# Patient Record
Sex: MALE | Race: WHITE
[De-identification: names, ages, dates, MRNs, and addresses within clinical notes are randomized per-mention and may not be internally consistent; named-entity substitution may affect disease eponyms.]

---

## 2019-03-04 ENCOUNTER — HOSPITAL ENCOUNTER (OUTPATIENT)
Dept: HOSPITAL 84 - D.OPS | Age: 69
LOS: 1 days | Discharge: HOME | End: 2019-03-05
Attending: FAMILY MEDICINE
Payer: MEDICARE

## 2019-03-04 ENCOUNTER — HOSPITAL ENCOUNTER (INPATIENT)
Dept: HOSPITAL 84 - D.ER | Age: 69
LOS: 1 days | Discharge: ANOTHER HOSPITAL | DRG: 690 | End: 2019-03-05
Attending: FAMILY MEDICINE | Admitting: FAMILY MEDICINE
Payer: MEDICARE

## 2019-03-04 VITALS — DIASTOLIC BLOOD PRESSURE: 80 MMHG | SYSTOLIC BLOOD PRESSURE: 156 MMHG

## 2019-03-04 VITALS — SYSTOLIC BLOOD PRESSURE: 142 MMHG | DIASTOLIC BLOOD PRESSURE: 73 MMHG

## 2019-03-04 VITALS — WEIGHT: 185.49 LBS | BODY MASS INDEX: 34.14 KG/M2 | BODY MASS INDEX: 34.14 KG/M2 | HEIGHT: 62 IN

## 2019-03-04 VITALS — DIASTOLIC BLOOD PRESSURE: 75 MMHG | SYSTOLIC BLOOD PRESSURE: 137 MMHG

## 2019-03-04 VITALS — SYSTOLIC BLOOD PRESSURE: 143 MMHG | DIASTOLIC BLOOD PRESSURE: 75 MMHG

## 2019-03-04 VITALS — BODY MASS INDEX: 33.7 KG/M2

## 2019-03-04 DIAGNOSIS — G40.909: ICD-10-CM

## 2019-03-04 DIAGNOSIS — E11.9: ICD-10-CM

## 2019-03-04 DIAGNOSIS — R45.851: ICD-10-CM

## 2019-03-04 DIAGNOSIS — K21.9: ICD-10-CM

## 2019-03-04 DIAGNOSIS — E78.5: ICD-10-CM

## 2019-03-04 DIAGNOSIS — E86.0: ICD-10-CM

## 2019-03-04 DIAGNOSIS — E66.9: ICD-10-CM

## 2019-03-04 DIAGNOSIS — I10: ICD-10-CM

## 2019-03-04 DIAGNOSIS — E53.8: ICD-10-CM

## 2019-03-04 DIAGNOSIS — E73.9: ICD-10-CM

## 2019-03-04 DIAGNOSIS — F03.90: ICD-10-CM

## 2019-03-04 DIAGNOSIS — N40.0: ICD-10-CM

## 2019-03-04 DIAGNOSIS — I69.354: ICD-10-CM

## 2019-03-04 DIAGNOSIS — D64.9: ICD-10-CM

## 2019-03-04 DIAGNOSIS — N39.0: Primary | ICD-10-CM

## 2019-03-04 DIAGNOSIS — F32.9: ICD-10-CM

## 2019-03-04 DIAGNOSIS — I25.10: ICD-10-CM

## 2019-03-04 DIAGNOSIS — F03.90: Primary | ICD-10-CM

## 2019-03-04 LAB
ALBUMIN SERPL-MCNC: 2.9 G/DL (ref 3.4–5)
ALP SERPL-CCNC: 74 U/L (ref 46–116)
ALT SERPL-CCNC: 18 U/L (ref 10–68)
AMPHETAMINES UR QL SCN: NEGATIVE QUAL
ANION GAP SERPL CALC-SCNC: 17.4 MMOL/L (ref 8–16)
APAP SERPL-MCNC: 0 UG/ML (ref 10–30)
APPEARANCE UR: CLEAR
BACTERIA #/AREA URNS HPF: (no result) /HPF
BARBITURATES UR QL SCN: NEGATIVE QUAL
BASOPHILS NFR BLD AUTO: 0.7 % (ref 0–2)
BENZODIAZ UR QL SCN: NEGATIVE QUAL
BILIRUB SERPL-MCNC: 0.17 MG/DL (ref 0.2–1.3)
BILIRUB SERPL-MCNC: NEGATIVE MG/DL
BUN SERPL-MCNC: 43 MG/DL (ref 7–18)
BZE UR QL SCN: NEGATIVE QUAL
CALCIUM SERPL-MCNC: 8.1 MG/DL (ref 8.5–10.1)
CANNABINOIDS UR QL SCN: NEGATIVE QUAL
CHLORIDE SERPL-SCNC: 109 MMOL/L (ref 98–107)
CHOLEST/HDLC SERPL: 2.7 RATIO (ref 2.3–4.9)
CO2 SERPL-SCNC: 24.3 MMOL/L (ref 21–32)
COLOR UR: YELLOW
CREAT SERPL-MCNC: 1.5 MG/DL (ref 0.6–1.3)
EOSINOPHIL NFR BLD: 4 % (ref 0–7)
ERYTHROCYTE [DISTWIDTH] IN BLOOD BY AUTOMATED COUNT: 14.6 % (ref 11.5–14.5)
EST. AVERAGE GLUCOSE BLD GHB EST-MCNC: 177 MG/DL (ref 74–154)
ETHANOL SERPL-MCNC: 2 MG/DL (ref 0–10)
GLOBULIN SER-MCNC: 4.3 G/L
GLUCOSE SERPL-MCNC: 125 MG/DL (ref 74–106)
GLUCOSE SERPL-MCNC: NEGATIVE MG/DL
HCT VFR BLD CALC: 35.7 % (ref 42–54)
HDLC SERPL-MCNC: 27 MG/DL (ref 32–96)
HGB BLD-MCNC: 11.5 G/DL (ref 13.5–17.5)
IMM GRANULOCYTES NFR BLD: 0.4 % (ref 0–5)
KETONES UR STRIP-MCNC: NEGATIVE MG/DL
LDL-HDL RATIO: 0.4 RATIO (ref 1.5–3.5)
LDLC SERPL-MCNC: 11 MG/DL (ref 0–100)
LYMPHOCYTES NFR BLD AUTO: 38.3 % (ref 15–50)
MAGNESIUM SERPL-MCNC: 1.8 MG/DL (ref 1.8–2.4)
MCH RBC QN AUTO: 29.3 PG (ref 26–34)
MCHC RBC AUTO-ENTMCNC: 32.2 G/DL (ref 31–37)
MCV RBC: 90.8 FL (ref 80–100)
MONOCYTES NFR BLD: 9.4 % (ref 2–11)
NEUTROPHILS NFR BLD AUTO: 47.2 % (ref 40–80)
NITRITE UR-MCNC: POSITIVE MG/ML
OPIATES UR QL SCN: NEGATIVE QUAL
OSMOLALITY SERPL CALC.SUM OF ELEC: 302 MOSM/KG (ref 275–300)
PCP UR QL SCN: NEGATIVE QUAL
PH UR STRIP: 5 [PH] (ref 5–6)
PLATELET # BLD: 244 10X3/UL (ref 130–400)
PMV BLD AUTO: 8.8 FL (ref 7.4–10.4)
POTASSIUM SERPL-SCNC: 4.7 MMOL/L (ref 3.5–5.1)
PROT SERPL-MCNC: 7.2 G/DL (ref 6.4–8.2)
PROT UR-MCNC: (no result) MG/DL
RBC # BLD AUTO: 3.93 10X6/UL (ref 4.2–6.1)
RBC #/AREA URNS HPF: (no result) /HPF (ref 0–5)
SODIUM SERPL-SCNC: 146 MMOL/L (ref 136–145)
SP GR UR STRIP: 1.02 (ref 1–1.02)
T4 SERPL-MCNC: 5.9 UG/DL (ref 4.7–13.3)
TRIGL SERPL-MCNC: 183 MG/DL (ref 30–200)
TSH SERPL-ACNC: 0.4 UIU/ML (ref 0.36–3.74)
UROBILINOGEN UR-MCNC: NORMAL MG/DL
WBC # BLD AUTO: 5.5 10X3/UL (ref 4.8–10.8)
WBC #/AREA URNS HPF: (no result) /HPF (ref 0–5)

## 2019-03-04 NOTE — NUR
PT RECEIVED TO UNIT VIA STRETCHER FROM ED WITH ER NURSE. PT TRANSFERRED TO ICU
BED WITHOUT DIFFUCULTY. PT CLOTHES REMOVED AND PLACED IN BAG WITH SHOES AND
PLACED IN GOWN. ANSWERS ORIENTATION QUESTIONS APPROPRIATLY WITH CONFUSION
NOTED IN FURTHER CONVERSATION, SUCH AS SAYING THAT THIS IS THE STRANGEST
Gnosticism AFTER SAYING HE WAS AT [facility]. PT COMPLAINED OF
BEING COLD WITH BLANKETS APPLIED AND THERMOSTAT RAISED FOR COMFORT. NO
CONCERNS NOTED AT THIS TIME. WILL CONTINUE TO OBSERVE.

## 2019-03-04 NOTE — NUR
PT RESTING ON ER STRETCHER, RESPIRATIONS EVEN AND UNLABORED, NO SIGNS OF
DISTRESS NOTED, WILL CONTINUE TO MONITOR.

## 2019-03-04 NOTE — NUR
PT ASSISTED TO STAND FOR URINE SAMPLE, WAS ASSISTED BACK TO BED, DENIES NEEDS,
WILL CONTINUE TO MONITOR.

## 2019-03-04 NOTE — NUR
PT RESTING IN BED, URINE SAMPLE REQUESTE, PT STATES HE CAN NOT ALWAYS GO WHEN
HE WANTS TO, ENCOURAGED HIM TO TRY, WILL CONTINUE TO MONITOR.

## 2019-03-04 NOTE — NUR
PT SPO2% DROPS AT TIMES AND RETURNS TO 90'S. PT SNORES AT TIMES OF DROP. PT
PLACED ON N/C 2LPM. WILL CONTINUE TO OBSERVE.

## 2019-03-05 ENCOUNTER — HOSPITAL ENCOUNTER (INPATIENT)
Dept: HOSPITAL 84 - D.PSYCH | Age: 69
LOS: 15 days | Discharge: HOME | DRG: 57 | End: 2019-03-20
Attending: PSYCHIATRY & NEUROLOGY | Admitting: PSYCHIATRY & NEUROLOGY
Payer: MEDICARE

## 2019-03-05 ENCOUNTER — HOSPITAL ENCOUNTER (OUTPATIENT)
Dept: HOSPITAL 84 - D.OPS | Age: 69
Discharge: OTHER INSTITUTION | End: 2019-03-05
Attending: FAMILY MEDICINE
Payer: MEDICARE

## 2019-03-05 VITALS — SYSTOLIC BLOOD PRESSURE: 133 MMHG | DIASTOLIC BLOOD PRESSURE: 74 MMHG

## 2019-03-05 VITALS
BODY MASS INDEX: 29.63 KG/M2 | WEIGHT: 184.39 LBS | BODY MASS INDEX: 29.63 KG/M2 | HEIGHT: 66 IN | BODY MASS INDEX: 29.63 KG/M2 | BODY MASS INDEX: 29.63 KG/M2 | WEIGHT: 184.39 LBS | HEIGHT: 66 IN

## 2019-03-05 VITALS — SYSTOLIC BLOOD PRESSURE: 126 MMHG | DIASTOLIC BLOOD PRESSURE: 77 MMHG

## 2019-03-05 VITALS — DIASTOLIC BLOOD PRESSURE: 81 MMHG | SYSTOLIC BLOOD PRESSURE: 132 MMHG

## 2019-03-05 VITALS — DIASTOLIC BLOOD PRESSURE: 68 MMHG | SYSTOLIC BLOOD PRESSURE: 113 MMHG

## 2019-03-05 VITALS — DIASTOLIC BLOOD PRESSURE: 80 MMHG | SYSTOLIC BLOOD PRESSURE: 130 MMHG

## 2019-03-05 VITALS — SYSTOLIC BLOOD PRESSURE: 122 MMHG | DIASTOLIC BLOOD PRESSURE: 77 MMHG

## 2019-03-05 VITALS — DIASTOLIC BLOOD PRESSURE: 78 MMHG | SYSTOLIC BLOOD PRESSURE: 129 MMHG

## 2019-03-05 VITALS — DIASTOLIC BLOOD PRESSURE: 71 MMHG | SYSTOLIC BLOOD PRESSURE: 133 MMHG

## 2019-03-05 VITALS — DIASTOLIC BLOOD PRESSURE: 77 MMHG | SYSTOLIC BLOOD PRESSURE: 132 MMHG

## 2019-03-05 VITALS — SYSTOLIC BLOOD PRESSURE: 131 MMHG | DIASTOLIC BLOOD PRESSURE: 68 MMHG

## 2019-03-05 VITALS — BODY MASS INDEX: 33.7 KG/M2

## 2019-03-05 DIAGNOSIS — F33.0: ICD-10-CM

## 2019-03-05 DIAGNOSIS — I69.319: Primary | ICD-10-CM

## 2019-03-05 DIAGNOSIS — E78.5: ICD-10-CM

## 2019-03-05 DIAGNOSIS — N39.0: ICD-10-CM

## 2019-03-05 DIAGNOSIS — N40.0: ICD-10-CM

## 2019-03-05 DIAGNOSIS — E86.0: ICD-10-CM

## 2019-03-05 DIAGNOSIS — F03.90: Primary | ICD-10-CM

## 2019-03-05 DIAGNOSIS — F01.50: ICD-10-CM

## 2019-03-05 DIAGNOSIS — D64.9: ICD-10-CM

## 2019-03-05 DIAGNOSIS — G40.909: ICD-10-CM

## 2019-03-05 DIAGNOSIS — I10: ICD-10-CM

## 2019-03-05 DIAGNOSIS — R45.851: ICD-10-CM

## 2019-03-05 DIAGNOSIS — E11.9: ICD-10-CM

## 2019-03-05 DIAGNOSIS — R26.9: ICD-10-CM

## 2019-03-05 LAB
ANION GAP SERPL CALC-SCNC: 14.8 MMOL/L (ref 8–16)
BUN SERPL-MCNC: 31 MG/DL (ref 7–18)
CALCIUM SERPL-MCNC: 7.4 MG/DL (ref 8.5–10.1)
CHLORIDE SERPL-SCNC: 109 MMOL/L (ref 98–107)
CO2 SERPL-SCNC: 23.1 MMOL/L (ref 21–32)
CREAT SERPL-MCNC: 1.1 MG/DL (ref 0.6–1.3)
GLUCOSE SERPL-MCNC: 201 MG/DL (ref 74–106)
OSMOLALITY SERPL CALC.SUM OF ELEC: 297 MOSM/KG (ref 275–300)
POTASSIUM SERPL-SCNC: 3.9 MMOL/L (ref 3.5–5.1)
SODIUM SERPL-SCNC: 143 MMOL/L (ref 136–145)

## 2019-03-05 NOTE — NUR
PATIENT RESTING IN BED WITH CALL BELL IN REACH. VSS. O2 AT 2 LITERS
VIA NC.  D5W INFUSING AT 125ML/HR VIA LEFT WRIST PIV.  PATIENT WAKES TO VOICE.
ORIENTED TO PERSON, YEAR, AND CITY.  TURNED PATIENT WITH OFFGOING NURSE TO
RIGHT SIDE.  NO SKIN BREAK DOWN. SCRATCHES TO LEGS AND SCABS TO ARMS.  WILL
CONTINUE TO MONITOR.

## 2019-03-05 NOTE — NUR
PT RESTING WITH EYES CLOSED AND CHEST RISING. EASILY AROUSED TO VERBAL
STIMULI. REASSESSMENT COMPLETED. CALL LIGHT IN REACH. WILL CONTINUE TO
OBSERVE.

## 2019-03-05 NOTE — MORECARE
CASE MANAGEMENT DISCHARGE SUMMARY
 
 
PATIENT: SHEREE WHITNEY                UNIT: [id number]
ACCOUNT#: [id number]                       ADM DATE: 19
AGE: 68     : [birth year]  SEX: M            ROOM/BED: D.2312    
AUTHOR: KEVIN OLIVO                             PHYSICIAN:                               
 
REFERRING PHYSICIAN: CECI OBANDO MD        
DATE OF SERVICE: 19
Discharge Plan
 
 
Patient Name: SHEREE WHITNEY
Facility: [facility]:[place]
Encounter #: [id number]
Medical Record #: [id number]
: 1950
Planned Disposition: Nursing Facility [facility] [[email or web address]
Anticipated Discharge Date: 
 
Discharge Date: 
Expected LOS: 
Initial Reviewer: [email or web address]
Initial Review Date: 2019
Generated: 3/5/19   6:55 pm 
  
 
 
 
 
 
 
 
Patient Name: SHEREE WHITNEY
 
Encounter #: [id number]
Page [id number]
 
 
 
 
 
Electronically Signed by KEVIN OLIVO on 19 at 1753
 
 
 
 
 
 
**All edits/amendments must be made on the electronic document**
 
DICTATION DATE: 19     : IVÁN  19     
RPT#: [id number]                                DC DATE:        
                                               STATUS: ADM IN  
[facility]
[address] [place] [address]
***END OF REPORT***

## 2019-03-05 NOTE — NUR
FAMILY CAME TO SEE PATIENT. SPOKE TO ME ABOUT THEIR REQUEST FOR PATIENT TO GO
TO [facility] AFTER DISCHARGE.   FIOR NOTIFIED.

## 2019-03-05 NOTE — NUR
PATIENT RESTING IN BED WITH STABLE VS. HAD LARGE INCONTINENT BM. NURSE CLEANED
AND CHANGED LINENS.  NO COMPLAINTS. WILL CONTINUE TO MONITOR.

## 2019-03-05 NOTE — MORECARE
CASE MANAGEMENT DISCHARGE SUMMARY
 
 
PATIENT: SHEREE WHITNEY                UNIT: [id number]
ACCOUNT#: [id number]                       ADM DATE: 19
AGE: 68     : 46  SEX: M            ROOM/BED: D.2312    
AUTHOR: GEOVANI,DOC                             PHYSICIAN:                               
 
REFERRING PHYSICIAN: CECI OBANDO MD        
DATE OF SERVICE: 19
Discharge Plan
 
 
Patient Name: SHEREE WHITNEY
Facility: [facility]:[place]
Encounter #: [id number]
Medical Record #: [id number]
: 1950
Planned Disposition: Nursing Facility [facility]
Anticipated Discharge Date: 
 
Discharge Date: 2019
Expected LOS: 
Initial Reviewer: [id number]
Initial Review Date: 2019
Generated: 3/5/19   7:29 pm 
Comments
 
DCP- Discharge Planning
 
Updated by [id number]: Gemini Mike on 3/5/19   5:26 pm CT
Patient Name: SHEREE WHITNEY                                     
Admission Status: ER   
Accout number: [id number]                              
Admission Date: 2019   
: 1950                                                        
Admission Diagnosis:   
Attending: CECI OBANDO                                               
Current LOS:  1   
  
Anticipated DC Date:    
Planned Disposition: Nursing Facility [facility]   
Primary Insurance: MEDICARE A & B   
  
  
Discharge Planning Comments: CM received call from Lakeisha Hernandez patients POA 
[phone].  She stated that the patient will not be able to return to 
[facility] when discharged. Lakeisha stated that the family was 
checking into other facilities for long term care. CM called [facility] to 
verify information. CM spoke with Caro @ [facility] she stated that patient 
 
was refusing to do therapy and they didn't have any long-term beds available. 
CM attempted to get history from Caro she stated that he has only been with 
them since 19 and had came to them post admission from [facility]. CM 
awaiting psych consult to fill out [facility] paperwork.  CM will work on placement 
for long-term NH placement.  CM will continue to follow and assist as needed 
with discharge planning / needs.  
  
  
  
  
  
  
: Gemini Mike
 DCPIA - Discharge Planning Initial Assessment
 
Updated by [id number]: Gemini Mike on 3/5/19   5:58 pm
*  Is the patient Alert and Oriented?
No
*  Preadmission Environment
Skilled Nursing Facility
*  Facility Name
[facility] [phone]
*  ADLs
Partial Dependent
*  Partial ADLs (Assistance needed)
Ambulation
Bathing
Dressing
Eating
Medication Management
Toileting
Transfers
*  List name and contact numbers for known caregivers / representatives who 
currently or will assist patient after discharge:
LAKEISHA HERNANDEZ - [facility]- [phone]  MELANY DERAS - [facility]- [phone]
 
*  Verbal permission to speak to the caregivers and representatives has been 
obtained from the patient.
N/A
*  Community resources currently utilized
None
*  Additional services required to return to the preadmission environment?
No
*  Can the patient safely return to the preadmission environment?
Yes
*  Has this patient been hospitalized within the prior 30 days at any 
hospital?
Yes
 
 
 
 
 
 
 
 
Last DP export: 3/5/19   5:15 pm
Patient Name: SHEREE WHITNEY
Encounter #: [id number]
Page [id number]
 
 
 
 
 
Electronically Signed by KEVIN OLIVO on 19 at 1829
 
 
 
 
 
 
**All edits/amendments must be made on the electronic document**
 
DICTATION DATE: 19     : IVÁN  19     
RPT#: [id number]                                DC DATE:19
                                               STATUS: DIS IN  
[facility]
[address] [place] [address]
***END OF REPORT***

## 2019-03-05 NOTE — NUR
SPOKE TO DR. OBANDO ON PHONE. OBTAINED ORDERS TO RESTART DAILY MEDICATION
REGIMIN WHILE IN HOSPITAL.

## 2019-03-05 NOTE — NUR
DR. OBANDO CAME TO SEE PATIENT. NO ADDITIONAL ORDERS GIVEN. WE ARE
DISCHARGING FROM ICU AND MOVING TO SENIOR CARE. CALLED AND GAVE REPORT TO
RECEIVING NURSE.

## 2019-03-05 NOTE — MORECARE
CASE MANAGEMENT DISCHARGE SUMMARY
 
 
PATIENT: SHEREE WHITNEY                UNIT: [id number]
ACCOUNT#: [id number]                       ADM DATE: 19
AGE: 68     : 46  SEX: M            ROOM/BED: D.2312    
AUTHOR: GEOVANI,DOC                             PHYSICIAN:                               
 
REFERRING PHYSICIAN: CECI OBANDO MD        
DATE OF SERVICE: 19
Discharge Plan
 
 
Patient Name: SHEREE WHITNEY
Facility: [facility]:[place]
Encounter #: [id number]
Medical Record #: [id number]
: 1950
Planned Disposition: Nursing Facility [facility]
Anticipated Discharge Date: 
 
Discharge Date: 2019
Expected LOS: 
Initial Reviewer: [id number]
Initial Review Date: 2019
Generated: 3/5/19   7:15 pm 
DCP- Discharge Planning
 
Updated by [id number]: Gemini Mike on 3/5/19   5:09 pm CT
Patient Name: SHEREE WHITNEY                                     
Admission Status: ER   
Accout number: [id number]                              
Admission Date: 2019   
: 1950                                                        
Admission Diagnosis:   
Attending: CECI OBANDO                                               
Current LOS:  1   
  
Anticipated DC Date:    
Planned Disposition: Nursing Facility [facility]   
Primary Insurance: MEDICARE A & B   
  
  
Discharge Planning Comments: CM received call from Lakeisha Hernandez patients POA 
[phone].  She stated that the patient will not be able to return to 
[facility] when discharged. Lakeisha stated that the family was 
checking into other facilities for long term care. CM called [facility] to 
verify information. CM spoke with Caro @ [facility] she stated that patient 
was refusing to do therapy and they didn't have any long-term beds available. 
CM attempted to get history from Caro she stated that he has only been with 
 
them since 19 and had came to them post admission from [facility]. CM 
awaiting psych consult to fill out [facility] paperwork.  CM will work on placement 
for long-term NH placement.  CM will continue to follow and assist as needed 
with discharge planning / needs.  
  
  
  
  
  
  
: Gemini Mike
 DCPIA - Discharge Planning Initial Assessment
 
Updated by [id number]: Gemini Mike on 3/5/19   5:58 pm
*  Is the patient Alert and Oriented?
No
*  Preadmission Environment
Skilled Nursing Facility
*  Facility Name
[facility] RE[facility] [phone]
*  ADLs
Partial Dependent
*  Partial ADLs (Assistance needed)
Ambulation
Bathing
Dressing
Eating
Medication Management
Toileting
Transfers
*  List name and contact numbers for known caregivers / representatives who 
currently or will assist patient after discharge:
LAKEISHA HERNANDEZ - POA- [phone]  MELANY DERAS - [facility]- [phone]
 
*  Verbal permission to speak to the caregivers and representatives has been 
obtained from the patient.
N/A
*  Community resources currently utilized
None
*  Additional services required to return to the preadmission environment?
No
*  Can the patient safely return to the preadmission environment?
Yes
*  Has this patient been hospitalized within the prior 30 days at any 
hospital?
Yes
 
 
 
 
 
 
 
 
Last DP export: 3/5/19   5:02 pm
Patient Name: SHEREE WHITNEY
Encounter #: [id number]
Page [id number]
 
 
 
 
 
Electronically Signed by KEVIN OLIVO on 19 at 1815
 
 
 
 
 
 
**All edits/amendments must be made on the electronic document**
 
DICTATION DATE: 19     : IVÁN  19     
RPT#: [id number]                                DC DATE:19
                                               STATUS: DIS IN  
[facility]
[address] [place] [address]
***END OF REPORT***

## 2019-03-05 NOTE — MORECARE
CASE MANAGEMENT DISCHARGE SUMMARY
 
 
PATIENT: SHEREE WHITNEY                UNIT: [id number]
ACCOUNT#: [id number]                       ADM DATE: 19
AGE: 68     : 46  SEX: M            ROOM/BED: D.2312    
AUTHOR: KEVIN OLIVO                             PHYSICIAN:                               
 
REFERRING PHYSICIAN: CECI OBANDO MD        
DATE OF SERVICE: 19
Discharge Plan
 
 
Patient Name: SHEREE WHITNEY
Facility: [facility]:[place]
Encounter #: [id number]
Medical Record #: [id number]
: 1950
Planned Disposition: Nursing Facility [facility]
Anticipated Discharge Date: 
 
Discharge Date: 
Expected LOS: 
Initial Reviewer: [email or web address]
Initial Review Date: 2019
Generated: 3/5/19   7:02 pm 
 DCPIA - Discharge Planning Initial Assessment
 
Updated by [id number]: Gemini Mike on 3/5/19   5:58 pm
*  Is the patient Alert and Oriented?
No
*  Preadmission Environment
Skilled Nursing Facility
*  Facility Name
[facility] RE[facility] [phone]
*  ADLs
Partial Dependent
*  Partial ADLs (Assistance needed)
Ambulation
Bathing
Dressing
Eating
Medication Management
Toileting
Transfers
*  List name and contact numbers for known caregivers / representatives who 
currently or will assist patient after discharge:
LAKEISHA HERNANDEZ [facility]- [phone]  MELANY DERAS [facility] [phone]
 
*  Verbal permission to speak to the caregivers and representatives has been 
 
obtained from the patient.
N/A
*  Community resources currently utilized
None
*  Additional services required to return to the preadmission environment?
No
*  Can the patient safely return to the preadmission environment?
Yes
*  Has this patient been hospitalized within the prior 30 days at any 
hospital?
Yes
 
 
 
 
 
 
 
Last DP export: 3/5/19   4:55 pm
Patient Name: SHEREE WHITNEY
Encounter #: [id number]
Page [id number]
 
 
 
 
 
Electronically Signed by KEVIN OLIVO on 19 at 1802
 
 
 
 
 
 
**All edits/amendments must be made on the electronic document**
 
DICTATION DATE: 19     : IVÁN  19     
RPT#: [id number]                                DC DATE:        
                                               STATUS: ADM IN  
[facility]
[address] [place] [address]
***END OF REPORT***

## 2019-03-06 VITALS — SYSTOLIC BLOOD PRESSURE: 116 MMHG | DIASTOLIC BLOOD PRESSURE: 65 MMHG

## 2019-03-06 VITALS — SYSTOLIC BLOOD PRESSURE: 127 MMHG | DIASTOLIC BLOOD PRESSURE: 82 MMHG

## 2019-03-06 VITALS — SYSTOLIC BLOOD PRESSURE: 130 MMHG | DIASTOLIC BLOOD PRESSURE: 75 MMHG

## 2019-03-06 LAB
ALBUMIN SERPL-MCNC: 2.8 G/DL (ref 3.4–5)
ALP SERPL-CCNC: 75 U/L (ref 46–116)
ALT SERPL-CCNC: 13 U/L (ref 10–68)
ANION GAP SERPL CALC-SCNC: 15.8 MMOL/L (ref 8–16)
BASOPHILS NFR BLD AUTO: 0.5 % (ref 0–2)
BILIRUB SERPL-MCNC: 0.23 MG/DL (ref 0.2–1.3)
BUN SERPL-MCNC: 21 MG/DL (ref 7–18)
CALCIUM SERPL-MCNC: 7.7 MG/DL (ref 8.5–10.1)
CHLORIDE SERPL-SCNC: 109 MMOL/L (ref 98–107)
CHOLEST/HDLC SERPL: 2.4 RATIO (ref 2.3–4.9)
CO2 SERPL-SCNC: 23.7 MMOL/L (ref 21–32)
CREAT SERPL-MCNC: 1.1 MG/DL (ref 0.6–1.3)
EOSINOPHIL NFR BLD: 4 % (ref 0–7)
ERYTHROCYTE [DISTWIDTH] IN BLOOD BY AUTOMATED COUNT: 14.3 % (ref 11.5–14.5)
EST. AVERAGE GLUCOSE BLD GHB EST-MCNC: 171 MG/DL (ref 74–154)
GLOBULIN SER-MCNC: 4.4 G/L
GLUCOSE SERPL-MCNC: 86 MG/DL (ref 74–106)
HCT VFR BLD CALC: 35.9 % (ref 42–54)
HDLC SERPL-MCNC: 33 MG/DL (ref 32–96)
HGB BLD-MCNC: 11.5 G/DL (ref 13.5–17.5)
IMM GRANULOCYTES NFR BLD: 0.3 % (ref 0–5)
LDL-HDL RATIO: 0.6 RATIO (ref 1.5–3.5)
LDLC SERPL-MCNC: 20 MG/DL (ref 0–100)
LYMPHOCYTES NFR BLD AUTO: 27.2 % (ref 15–50)
MCH RBC QN AUTO: 29 PG (ref 26–34)
MCHC RBC AUTO-ENTMCNC: 32 G/DL (ref 31–37)
MCV RBC: 90.4 FL (ref 80–100)
MONOCYTES NFR BLD: 10.5 % (ref 2–11)
NEUTROPHILS NFR BLD AUTO: 57.5 % (ref 40–80)
OSMOLALITY SERPL CALC.SUM OF ELEC: 288 MOSM/KG (ref 275–300)
PLATELET # BLD: 242 10X3/UL (ref 130–400)
PMV BLD AUTO: 8.9 FL (ref 7.4–10.4)
POTASSIUM SERPL-SCNC: 4.5 MMOL/L (ref 3.5–5.1)
PROT SERPL-MCNC: 7.2 G/DL (ref 6.4–8.2)
RBC # BLD AUTO: 3.97 10X6/UL (ref 4.2–6.1)
SODIUM SERPL-SCNC: 144 MMOL/L (ref 136–145)
TRIGL SERPL-MCNC: 133 MG/DL (ref 30–200)
TSH SERPL-ACNC: 0.69 UIU/ML (ref 0.36–3.74)
WBC # BLD AUTO: 6 10X3/UL (ref 4.8–10.8)

## 2019-03-06 NOTE — NUR
RECEIVED IN PATIENT ROOM. RESTING IN BED WITH EYES CLOSED. RESPONDS TO VOICE.
CALM WITH CARE AND ASSESSMENT. DENIES THOUGHTS OF SELF HARM. REDIRECT AND
REORIENT AS NEEDED. RESTING IN BED WITH EYES CLOSED AT THIS TIME. CONTINUE
PLAN OF CARE.

## 2019-03-06 NOTE — NUR
REC'D PT IN BED RESTING. RESPONDS TO VERBAL STIMULI. ALERT AND ORIENTED X 2.
CALM AND COOPERATIVE WITH ASSESSMENT. PRESCRIBED MEDS PROVIDED. MED COMPLIANT.
FALL PRECAUTIONS IN PLACE. PT DENIES ANY THOUGHTS OF SELF HARM. WILL CONTINUE
TO MONITOR Q 15 MINUTES FOR SAFETY. WILL CPOC.

## 2019-03-06 NOTE — MORECARE
CASE MANAGEMENT DISCHARGE SUMMARY
 
 
PATIENT: SHEREE WHITNEY                UNIT: [id number]
ACCOUNT#: [id number]                       ADM DATE: 19
AGE: 68     : 46  SEX: M            ROOM/BED: D.2312    
AUTHOR: GEOVANI,DOC                             PHYSICIAN:                               
 
REFERRING PHYSICIAN: CECI OBANDO MD        
DATE OF SERVICE: 19
Discharge Plan
 
 
Patient Name: SHEREE WHTINEY
Facility: [facility]:[place]
Encounter #: [id number]
Medical Record #: [id number]
: 1950
Planned Disposition: Nursing Facility [facility]
Anticipated Discharge Date: 
 
Discharge Date: 2019
Expected LOS: 
Initial Reviewer: [id number]
Initial Review Date: 2019
Generated: 3/6/19  10:49 am 
Comments
 
DCP- Discharge Planning
 
Updated by [id number]: Gemini Mike on 3/5/19   5:26 pm CT
Patient Name: SHEREE WHITNEY                                     
Admission Status: ER   
Accout number: [id number]                              
Admission Date: 2019   
: 1950                                                        
Admission Diagnosis:   
Attending: CECI OBANDO                                               
Current LOS:  1   
  
Anticipated DC Date:    
Planned Disposition: Nursing Facility [facility]   
Primary Insurance: MEDICARE A & B   
  
  
Discharge Planning Comments: CM received call from Lakeisha Hernandez patients POA 
[phone].  She stated that the patient will not be able to return to 
[facility] when discharged. Lakeisha stated that the family was 
checking into other facilities for long term care. CM called [facility] to 
verify information. CM spoke with Caro @ [facility] she stated that patient 
 
was refusing to do therapy and they didn't have any long-term beds available. 
CM attempted to get history from Caro she stated that he has only been with 
them since 19 and had came to them post admission from [facility]. CM 
awaiting psych consult to fill out [facility] paperwork.  CM will work on placement 
for long-term NH placement.  CM will continue to follow and assist as needed 
with discharge planning / needs.  
  
  
  
  
  
  
: Gemini Mike
 DCPIA - Discharge Planning Initial Assessment
 
Updated by [id number]: Gemini Mike on 3/5/19   5:58 pm
*  Is the patient Alert and Oriented?
No
*  Preadmission Environment
Skilled Nursing Facility
*  Facility Name
[facility] [phone]
*  ADLs
Partial Dependent
*  Partial ADLs (Assistance needed)
Ambulation
Bathing
Dressing
Eating
Medication Management
Toileting
Transfers
*  List name and contact numbers for known caregivers / representatives who 
currently or will assist patient after discharge:
LAKEISHA HERNANDEZ - [facility]- [phone]  MELANY DERAS - [facility]- [phone]
 
*  Verbal permission to speak to the caregivers and representatives has been 
obtained from the patient.
N/A
*  Community resources currently utilized
None
*  Additional services required to return to the preadmission environment?
No
*  Can the patient safely return to the preadmission environment?
Yes
*  Has this patient been hospitalized within the prior 30 days at any 
hospital?
Yes
 
 
 
 
 
 
 
 
Last DP export: 3/5/19   5:29 pm
Patient Name: SHEREE WHITNEY
Encounter #: [id number]
Page [id number]
 
 
 
 
 
Electronically Signed by KEVIN OLIVO on 19 at 0949
 
 
 
 
 
 
**All edits/amendments must be made on the electronic document**
 
DICTATION DATE: 19     : IVÁN  19     
RPT#: [id number]                                DC DATE:19
                                               STATUS: DIS IN  
[facility]
[address] [place] [address]
***END OF REPORT***

## 2019-03-07 VITALS — SYSTOLIC BLOOD PRESSURE: 126 MMHG | DIASTOLIC BLOOD PRESSURE: 57 MMHG

## 2019-03-07 LAB
25(OH)D3 SERPL-MCNC: 25.8 NG/ML (ref 30–100)
FOLATE SERPL-MCNC: 19.8 NG/ML (ref 3–?)
VIT B12 SERPL-MCNC: >2000 PG/ML (ref 232–1245)

## 2019-03-07 NOTE — PSY
PATIENT NAME:SHEREE WHITNEY                          MEDICAL RECORD: [id number]
: [birth year]                                              LOCATION:[facility]
ADMISSION DATE: 19    ACCOUNT: [id number]
                                                           
PSYCHIATRIC EVALUATION
 
 
DATE OF EVALUATION: 19
 
 
IDENTIFYING DATA:  The patient is 68 years old and he is admitted to the
hospital on a voluntary basis.
 
CHIEF COMPLAINT:  Suicidal thoughts.
 
HISTORY OF PRESENT ILLNESS:  The patient clearly has a history of dementia and
has been admitted to the [facility] for rehabilitative services.  He
apparently had a stroke or intraparenchymal bleed without a midline shift about
6 weeks ago.  He has had two cerebrovascular events and does have some
left-sided weakness.  While at the nursing home, he told his physical therapist
that he wanted to kill himself.  He repeated those thoughts when brought to the
hospital, but now says he does not want to kill himself.  He is endorsing
numerous neurovegetative depressive symptoms.  He also has clear evidence of
some confusion.
 
PAST MEDICAL HISTORY:  Significant for stroke, diabetes, hypertension, coronary
artery disease, and hyperlipidemia.
 
PAST PSYCHIATRIC HISTORY:  Significant for depression and a previous suicide
attempt 5 years ago when he held a knife to his throat and threatened to cut his
throat while at some sort of a [place] party.  He had been drinking at
that time.
 
ALLERGIES:  No known drug allergies.
 
CURRENT MEDICATIONS:  Include Norvasc, aspirin, Lipitor, Glucotrol, Keppra,
Lexapro, lisinopril, Claritin, Glucophage, Lopressor, ranitidine, and vitamin
B12.
 
FAMILY HISTORY:  Noncontributory.
 
SOCIAL HISTORY:  The patient is .  He does have adult children who live
out of state.  He did graduate from high school and had several episodes of
vocational training in which he partially completed some of the training.  He is
a nonsmoker.  He has not drank any in recent months or years and has never been
a recreational drug user.  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
maintenance for [employer].
 
MENTAL STATUS EXAMINATION:  The patient is awake, alert, and oriented to person
and place, but only partially to time and situation.  His mood is depressed. 
His affect is constricted.  Thought processes are circumstantial.  Memory,
concentration, and abstraction abilities are moderately impaired and he denies
any active intent to harm himself or others as well as active psychotic
symptoms.
 
ASSETS:  Stable living environment.
 
LIABILITIES:  Limited insight.
 
 
DIAGNOSTIC IMPRESSION:
AXIS I:  Vascular dementia and major depression, moderate severity without
psychotic features.
AXIS II:  Deferred.
AXIS III:  History of stroke, seizure disorder, diabetes, hypertension,
hyperlipidemia, diabetes, anemia, and benign prostatic hypertrophy.
AXIS IV:  Moderate.
AXIS V:  Global assessment of functioning is 35.
 
PLAN:  At this time, the patient is admitted to the hospital secondary to
confusion and depressive symptoms with suicidal statements.  He will be
comprehensively evaluated from both medical, psychological, and social
standpoint.  He will be treated with both mood stabilizing and memory enhancing
medications.  His long-term prognosis is guarded.
 
TRANSINT:[id number] Voice Confirmation ID: [id number] DOCUMENT ID: [id number]
                                           
                                           NAZANIN QUEVEDO MD             
 
 
 
Electronically Signed by NAZANIN QUEVEDO on 19 at 1304
 
 
 
 
 
 
 
 
 
 
 
 
 
 
 
 
 
 
 
 
 
 
 
 
 
 
 
 
 
CC:                                                             [id number]
DICTATION DATE: 19 1529     :     19      [facility] [place]  
                                                                              
[id number] [place] [address]

## 2019-03-07 NOTE — NUR
B) Patient is alert and oriented to person and place, follows instruction,
I)Administered scheduled medications as ordered, monitored FSBS, assisted with
needs,
R) Medication compliant, cooperative
P) Continue plan of care.

## 2019-03-07 NOTE — NUR
IS ORIENTED WITH SOME CONFUSION PRESENT.DENIES WANTING TO HARM SELF
NOW.STATES' in the last 10 years we had a party at my house and they were not
paying any attention to me and so i went into the kitchen and got a knife and
threatened to kill myself".compliant with staff with staff and meds.will
continue with plan of care,monitor for changes and safety.

## 2019-03-08 VITALS — DIASTOLIC BLOOD PRESSURE: 75 MMHG | SYSTOLIC BLOOD PRESSURE: 132 MMHG

## 2019-03-08 VITALS — DIASTOLIC BLOOD PRESSURE: 79 MMHG | SYSTOLIC BLOOD PRESSURE: 140 MMHG

## 2019-03-08 NOTE — NUR
PATIENT HAS A FLAT AFFECT, KEEPS TO HIMSELF, DENIES S/I. COMPLIANT WITH MEDS,
NO ADVERSE REACTION NOTED. ABLE TO MAKE NEEDS KNOWN, WILL FOLLOW POC

## 2019-03-08 NOTE — PN
PATIENT:SHEREE WHITNEY                      MEDICAL RECORD: [id number]
                                                         LOCATION:[facility]
                                                         ADMISSION DATE: 03/05/19
 
PROGRESS NOTE
 
 
DATE OF SERVICE:  03/07/2019
 
SUBJECTIVE:  The patient's case was discussed with staff.  He has no new
complaint.
 
OBJECTIVE:  The patient denies intent to harm himself or others.  He generally
tolerates his medicines well.  Eye contact is fair.  Concentration is fair.
 
ASSESSMENT:  No change in diagnoses.
 
PLAN:  Current medicines have been reviewed and will be maintained.  Long-term
prognosis is guarded.  I am going to start him on Namenda for its memory
enhancing properties.
 
TRANSINT:[id number] Voice Confirmation ID: [id number] DOCUMENT ID: [id number]
 
 
 
 
                                           
                                           NAZANIN QUEVEDO MD             
 
 
 
Electronically Signed by NAZANIN QUEVEDO on 03/08/19 at 1349
 
 
 
 
 
 
 
 
 
 
 
 
 
 
 
 
 
 
 
CC:                                                             [id number]
DICTATION DATE: 03/07/19 1604     :     03/07/19 2322      ADM IN  
                                                                              
[id number] [place] [id number]

## 2019-03-08 NOTE — NUR
RECEIVED PATIENT IN DINING ROOM FOR B'FAST, ALERT, CALM, COOPERATIVE, QUIET.
 
MEDS ADMIN PER ORDERS WITH COMPLETE MED COMPLIANCE NOTED.
 
COOPERATIVE WITH GROUP AND STAFF REQUESTS.
 
CONT POC INCLUDING MEDS AND GROUP THERAPY AS DIRECTED

## 2019-03-08 NOTE — NUR
The patient's POA called and asked about how he is doing. She also stated "He
is supposed to be going in to long term care and the  is supposed
to call me, but I have been in and out of cell phone service areas, but if
anyone needs me, please call." Let the POA know that the patient is quiet he
did get agitated with staff because he does not like being asked "Where are
you going?" Explained to the POA that we have them in our view so that no one
falls. She verbalized understanding.

## 2019-03-08 NOTE — NUR
B) Patient is alert and oriented to person, calm and cooperative this shift,
I) Administered scheduled medications as ordered, monitored for safety,
R) Mediation compliant, pleasant and friendly toward staff,
P) Continue plan of care.

## 2019-03-09 VITALS — DIASTOLIC BLOOD PRESSURE: 78 MMHG | SYSTOLIC BLOOD PRESSURE: 137 MMHG

## 2019-03-09 VITALS — SYSTOLIC BLOOD PRESSURE: 118 MMHG | DIASTOLIC BLOOD PRESSURE: 76 MMHG

## 2019-03-09 NOTE — PN
PATIENT:SHEREE WHITNEY                      MEDICAL RECORD: [id number]
                                                         LOCATION:[facility]
                                                         ADMISSION DATE: 03/05/19
 
PROGRESS NOTE
 
 
DATE OF SERVICE:  03/08/2019
 
SUBJECTIVE:  The patient's case was discussed with staff.  He has no new
complaint.
 
OBJECTIVE:  The patient is in good behavioral control.  He has no thoughts of
harming himself or others.  He is going to be referred to a local nursing home. 
His long-term prognosis is guarded.
 
TRANSINT:[id number] Voice Confirmation ID: [id number] DOCUMENT ID: [id number]
 
 
 
 
                                           
                                           NAZANIN QUEVEDO MD             
 
 
 
Electronically Signed by NAZANIN QUEVEDO on 03/09/19 at 0839
 
 
 
 
 
 
 
 
 
 
 
 
 
 
 
 
 
 
 
 
 
 
 
 
CC:                                                             [id number]
DICTATION DATE: 03/08/19 1555     :     03/08/19 2217      ADM IN  
                                                                              
[place] [id number] [place] [phone]

## 2019-03-09 NOTE — NUR
PT IS ALERT AND ORIENTED TO PERSON AND PLACE. CALM AND COOPERATIVE WITH
ASSESSMENT. NO SI NOTED OR VOICED. PT IS GUARDED AT TIMES. PRESCRIBED MEDS
PROVIDED. MED COMPLIANT. FALL PRECAUTIONS IN PLACE. REDIRECT AND REORIENT AS
NEEDED. WILL CONTINUE TO MONITOR Q 15 MINUTES FOR SAFETY. WILL CPOC.

## 2019-03-09 NOTE — NUR
PATIENT IS QUIET, FLAT, APPEARS SADDENED, VERY LITTLE MOTIVATION, ENCOURAGING
WORDS ARE GIVEN TO PATIENT, COMPLIANT WITH MEDS, NO ADVERSE REACTION NOTED.
WILL FOLLOW POC

## 2019-03-10 VITALS — SYSTOLIC BLOOD PRESSURE: 129 MMHG | DIASTOLIC BLOOD PRESSURE: 75 MMHG

## 2019-03-10 VITALS — SYSTOLIC BLOOD PRESSURE: 124 MMHG | DIASTOLIC BLOOD PRESSURE: 68 MMHG

## 2019-03-10 NOTE — NUR
REC'D PT SITTING IN W/C IN HALLWAY BY NURSES STATION WITH PEERS. PT DENIES ANY
FEELINGS OR THOUGHTS OF SELF HARM AT THIS TIMES. PT GETS UPSET EASILY WHEN HAS
INCONT. EPISODES AT TIMES. EXPLAINED TO PT ITS OKAY THAT IS WHAT THE STAFF IS
HERE FOR TO ASSIST HIM. CALM AND COOPERATIVE WITH ASSESSMENT. REDIRECT AND
REORIENT AS NEEDED. PRESCRIBED MEDS PROVIDED. MED COMPLIANT. FALL PRECAUTIONS
IN PLACE. WILL CONTINUE TO MONITOR Q 15 MINUTES FOR SAFETY. WILL CPOC.

## 2019-03-10 NOTE — NUR
RECEIVED IN DAYROOM. SETTING IN A CHAIR WITH PEERS BY HIS SIDE, SOCIALIZING AT
TIMES. CALM AND COOPERATIVE WITH CARE AND ASSESSMENT. DENIES THOUGHTS OF SELF
HARM. ENCOURAGE TO EXPRESS NEEDS. CONTINUES TO RELAX IN CHAIR. CONTINUE PLAN
OF CARE

## 2019-03-10 NOTE — PN
PATIENT:SHEREE WHITNEY                      MEDICAL RECORD: [id number]
                                                         LOCATION:[facility]
                                                         ADMISSION DATE: 03/05/19
 
PROGRESS NOTE
 
 
DATE OF SERVICE:  03/09/2019
 
SUBJECTIVE:  The patient's case was discussed with staff.  He has no new
complaint.
 
OBJECTIVE:  The patient is in good behavioral control with poor insight about
his condition.  He does tolerate his medicines well.
 
ASSESSMENT:  No change in diagnoses.
 
PLAN:  Supportive and educational interventions were made.  Long-term prognosis
is guarded.
 
TRANSINT:[id number] Voice Confirmation ID: [id number] DOCUMENT ID: [id number]
 
 
 
 
                                           
                                           NAZANIN QUEVEDO MD             
 
 
 
Electronically Signed by NAZANIN QUEVEDO on 03/10/19 at 1137
 
 
 
 
 
 
 
 
 
 
 
 
 
 
 
 
 
 
 
 
CC:                                                             [id number]
DICTATION DATE: 03/09/19 0846     :     03/09/19 0859      ADM IN  
                                                                              
[place] [phone]

## 2019-03-11 VITALS — DIASTOLIC BLOOD PRESSURE: 78 MMHG | SYSTOLIC BLOOD PRESSURE: 142 MMHG

## 2019-03-11 NOTE — NUR
RECEIVED IN DAYROOM. SITTING QUIETLY IN CHAIR AND WATCHING TV. CALM AND
COOPERATIVE WITH CARE AND ASSESSMENT. DENIES THOUGHTS OF SELF HARM. REDIRECT
AND REORIENT AS NEEDED. GETTING READY FOR BED AT THIS TIME. CONTINUE PLAN OF
CARE.

## 2019-03-11 NOTE — NUR
Nutrition Follow Up:
Chart reviewed
Diet: ADA
PO Intake: 69% meal avg
BM: 3/11/19
Meds and labs reviewed
Rec continue current diet.
RD following.

## 2019-03-11 NOTE — NUR
REC'D PT IN HALLWAY SITTING IN W/C. PT IS CALM AND COOPERATIVE WITH
ASSESSMENT. PT DENIES ANY THOUGHTS OR FEELINGS OF SELF HARM AT THIS TIME.
REDIRECT AND REORIENT AS NEEDED. PRESCRIBED MEDS PROVIDED. MED COMPLIANT. FALL
PRECAUTIONS IN PLACE. WILL CONTINUE TO MONITOR Q 15 MINUTES FOR SAFETY. WILL
CPOC.

## 2019-03-11 NOTE — PN
PATIENT:SHEREE WHITNEY                      MEDICAL RECORD: [id number]
                                                         LOCATION:[facility]
                                                         ADMISSION DATE: 03/05/19
 
PROGRESS NOTE
 
 
DATE OF SERVICE:  03/10/2019
 
SUBJECTIVE:  The patient's case was discussed with staff.  He has no new
complaint.
 
OBJECTIVE:  The patient is in good behavioral control with poor insight about
his condition.  He generally tolerates his medicines well.
 
ASSESSMENT:
1.  Vascular dementia.
2.  Major depression.
 
PLAN:  The patient's Lexapro is going to be increased to 20 mg daily.  His
long-term prognosis is guarded.  It is clear that he is going to require
24-hour-a-day supervision.
 
TRANSINT:[id number] Voice Confirmation ID: [id number] DOCUMENT ID: [id number]
 
 
 
 
                                           
                                           NAZANIN QUEVEDO MD             
 
 
 
Electronically Signed by NAZANIN QUEVEDO on 03/11/19 at 1558
 
 
 
 
 
 
 
 
 
 
 
 
 
 
 
 
 
CC:                                                             [id number]
DICTATION DATE: 03/10/19 1156     :     03/10/19 1244      ADM IN  
                                                                              
[place] [id number] [place] [address]

## 2019-03-12 VITALS — DIASTOLIC BLOOD PRESSURE: 76 MMHG | SYSTOLIC BLOOD PRESSURE: 129 MMHG

## 2019-03-12 VITALS — DIASTOLIC BLOOD PRESSURE: 79 MMHG | SYSTOLIC BLOOD PRESSURE: 134 MMHG

## 2019-03-12 NOTE — NUR
FSBS CHECKED.  FSBS 46, PEANUT BUTTER, CRACKERS, AND MILK ADMIN. PT ALERT, NO
DIFFICULTIES CHEWING AND SWALLOWING.  WILL RE-CHECK.

## 2019-03-12 NOTE — NUR
RECEIVED PATIENT IN DINING ROOM FOR B'FAST, ALERT, CALM, COOPERATIVE WITH
CARE, NO AGGRESSION NOTED.
 
MEDS ADMIN PER ORDERS WITH COMPLETE MED COMPLIANCE NOTED.
 
COOPERATIVE WITH GROUP ACTIVITIES AND CARE.
 
CONT POC INCLUDING MEDS AND GROUP THEAPY AS DIRECTED.

## 2019-03-12 NOTE — PN
PATIENT:SHEREE WHITNEY                      MEDICAL RECORD: [id number]
                                                         LOCATION:[facility]
                                                         ADMISSION DATE: 03/05/19
 
PROGRESS NOTE
 
 
DATE OF SERVICE:  03/11/2019
 
SUBJECTIVE:  The patient's case was discussed with staff.  He has no new
complaint.
 
OBJECTIVE:  The patient denies intent to harm himself or others.  He generally
tolerates his medicines well.  Eye contact is fair.
 
ASSESSMENT:  No change in diagnoses.
 
PLAN:  Brief supportive and educational interventions were made.  The patient
will be maintained on current medicines.
 
TRANSINT:[id number] Voice Confirmation ID: [id number] DOCUMENT ID: [id number]
 
 
 
 
                                           
                                           NAZANIN QUEVEDO MD             
 
 
 
Electronically Signed by NAZANIN QUEVEDO on 03/12/19 at 1258
 
 
 
 
 
 
 
 
 
 
 
 
 
 
 
 
 
 
 
 
CC:                                                             [id number]
DICTATION DATE: 03/11/19 1630     :     03/11/19 1901      ADM IN  
                                                                              
[facility]                                          
[address] [place] [address]

## 2019-03-13 VITALS — SYSTOLIC BLOOD PRESSURE: 149 MMHG | DIASTOLIC BLOOD PRESSURE: 79 MMHG

## 2019-03-13 VITALS — SYSTOLIC BLOOD PRESSURE: 128 MMHG | DIASTOLIC BLOOD PRESSURE: 68 MMHG

## 2019-03-13 NOTE — NUR
B) Patient is alert and oriented to person and place, calm and cooperative
this shift,
I) Administered scheduled medications as ordered, monitored for safety and for
needs,
R) Medication compliant, resting quietly in his bed,
P) Continue plan of care.

## 2019-03-13 NOTE — PN
PATIENT:SHEREE WHITNEY                      MEDICAL RECORD: [id number]
                                                         LOCATION:[facility]
                                                         ADMISSION DATE: 03/05/19
 
PROGRESS NOTE
 
 
DATE OF SERVICE:  03/12/2019
 
SUBJECTIVE:  The patient's case was discussed with staff.  He has no new
complaint.
 
OBJECTIVE:  The patient denies intent to harm himself or others.  He generally
tolerates his medicines well.  He is not eating very well, but cannot explain
this to me.  He is actually overweight, so it is nothing critical, but I am
still distressed that he is eating virtually nothing.
 
ASSESSMENT:
1.  Vascular dementia.
2.  Major depression.
 
PLAN:  The patient is going to be started on Megace to assist with appetite
stimulation.  His long-term prognosis is guarded.
 
TRANSINT:[id number] Voice Confirmation ID: [id number] DOCUMENT ID: [id number]
 
 
 
 
                                           
                                           NAZANIN QUEVEDO MD             
 
 
 
Electronically Signed by NAZANIN QUEVEDO on 03/13/19 at 1632
 
 
 
 
 
 
 
 
 
 
 
 
 
 
 
 
CC:                                                             [id number]
DICTATION DATE: 03/12/19 2002     :     03/13/19 0106      ADM IN  
                                                                              
[place] [id number] [place] [phone]

## 2019-03-13 NOTE — NUR
RECEIVED PATIENT IN DINING ROOM FOR B'FAST, ALERT, CALM, COOPERATIVE, NO
BEHAVIORAL ISSUES NOTED.
 
MEDS ADMIN PER ORDERS, NO PROBLEM SWALLOWING MEDS.
 
COOOPERATIVE WITH GROUP AND STAFF REQUESTS.
 
CONT POC INCLUDING MEDS AND GROUP THERAPY AS DIRECTED.

## 2019-03-14 VITALS — SYSTOLIC BLOOD PRESSURE: 114 MMHG | DIASTOLIC BLOOD PRESSURE: 68 MMHG

## 2019-03-14 VITALS — DIASTOLIC BLOOD PRESSURE: 70 MMHG | SYSTOLIC BLOOD PRESSURE: 125 MMHG

## 2019-03-14 NOTE — NUR
RECEIVED IN DAYROOM. SITTING IN CHAIR AND REPEATEDLY COMPLAINING ABOUT BEING
HERE. CALM AND COOPERATIVE WITH CARE AND ASSESSMENT. DENIES THOUGHTS OF SELF
HARM. REDIRECT AND REORIENT AS NEEDED. RESTING IN BED WITH EYES CLOSED AT THIS
TIME. CONTINUE PLAN OF CARE.

## 2019-03-14 NOTE — NUR
B) The patient is awake and he is pleasant. He self propels in his w/c. He has
not been aggressive. I) Provide prescribed meds. R) The patient is compliant
with meds. P) Continue POC.

## 2019-03-14 NOTE — PN
PATIENT:SHEREE WHITNEY                      MEDICAL RECORD: [id number]
                                                         LOCATION:[facility]
                                                         ADMISSION DATE: 03/05/19
 
PROGRESS NOTE
 
 
DATE OF SERVICE:  03/13/2019
 
SUBJECTIVE:  The patient's case was discussed with staff.  He has no new
complaint.
 
OBJECTIVE:  The patient denies intent to harm himself or others.  He is
tolerating his medicines well.
 
ASSESSMENT:
1.  Vascular dementia.
2.  Major depression.
 
PLAN:  The patient will have his Namenda increased to 5 mg twice daily.  Namenda
is being used to treat his underlying cognitive impairment.  He will be
monitored for clinical changes associated with its use.
 
TRANSINT:[id number] Voice Confirmation ID: [id number] DOCUMENT ID: [id number]
 
 
 
 
                                           
                                           NAZANIN QUEVEDO MD             
 
 
 
Electronically Signed by NAZANIN QUEVEDO on 03/14/19 at 1531
 
 
 
 
 
 
 
 
 
 
 
 
 
 
 
 
 
CC:                                                             [id number]
DICTATION DATE: 03/13/19 1708     :     03/13/19 1952      ADM IN  
                                                                              
[facility]                                          
[address] [place] [address]

## 2019-03-15 VITALS — SYSTOLIC BLOOD PRESSURE: 124 MMHG | DIASTOLIC BLOOD PRESSURE: 67 MMHG

## 2019-03-15 VITALS — SYSTOLIC BLOOD PRESSURE: 140 MMHG | DIASTOLIC BLOOD PRESSURE: 93 MMHG

## 2019-03-15 NOTE — NUR
B) Patient is alert and oriented to person, patient states that he does not
understand why he is here,
I) Administered scheduled medications as ordered, monitored for safety,
monitored FSBS
R) Mediation compliant, social with peers,
P) Continue plan of care.

## 2019-03-15 NOTE — NUR
PATIENT IS QUIET, FLAT AFFECT, INTERACTS WITH OTHERS AT TIMES, COMLIANT WITH
MEDS, MAKES NEEDS KNOWN, HOLDS HEAD DOWN A LOT. NO ADVERSE REACTION NOTED.
WILL FOLLOW POC

## 2019-03-15 NOTE — NUR
B) The patient is calm and he has been quiet. He is coughing from time to time
and he has slept a lot today. He says he feels fine though. He self propels in
a w/c and he can transfer alone. He gets irritable when anyone tries to help
him. He has been exposed to the flu and his medical Dr. Mosley prescribed
him Tamiflu prophylactically. I) Provide prescribed meds. R) The patient is
compliant with meds. P) Continue POC.

## 2019-03-15 NOTE — PN
PATIENT:SHEREE WHITNEY                      MEDICAL RECORD: [id number]
                                                         LOCATION:[facility]
                                                         ADMISSION DATE: 03/05/19
 
PROGRESS NOTE
 
 
DATE OF SERVICE:  03/14/2019
 
SUBJECTIVE:  The patient's case was discussed with staff.  He has no new
complaint.
 
OBJECTIVE:  The patient is in good behavioral control.  He has limited insight
about his condition.  He does tolerate his medicines well.  His mood is fairly
depressed, but I do not think he has had an opportunity for the Lexapro to have
much of an effect.
 
ASSESSMENT:
1.  Vascular dementia.
2.  Major depression.
 
PLAN:  The patient will be maintained on current medicines, which I have
reviewed.  His long-term prognosis is guarded.  He does need 24-hour-a-day
supervision.  I have spoken with him about this and he is agreeable to it.
 
TRANSINT:[id number] Voice Confirmation ID: [id number] DOCUMENT ID: [id number]
 
 
 
 
                                           
                                           NAZANIN QUEVEDO MD             
 
 
 
Electronically Signed by NAZANIN QUEVEDO on 03/15/19 at 1231
 
 
 
 
 
 
 
 
 
 
 
 
 
 
 
CC:                                                             [id number]
DICTATION DATE: 03/14/19 1632     :     03/14/19 2348      ADM IN  
                                                                              
[facility]                                          
[address] [place] [address]

## 2019-03-16 VITALS — SYSTOLIC BLOOD PRESSURE: 150 MMHG | DIASTOLIC BLOOD PRESSURE: 77 MMHG

## 2019-03-16 VITALS — SYSTOLIC BLOOD PRESSURE: 133 MMHG | DIASTOLIC BLOOD PRESSURE: 80 MMHG

## 2019-03-16 NOTE — NUR
RECEIVED PATIENT IN DINING ROOM FOR B'FAST, ALERT, CALM, COOPERATIVE.
 
MEDS ADMIN PER ORDERS WITH COMPLETE MED COMPLIANCE NOTED.
 
COOPERATIVE WITH GROUP AND STAFF REQUESTS.
 
CONT POC INCLUDING MEDS AND GROUP THERAPY AS DIRECTED.

## 2019-03-17 VITALS — DIASTOLIC BLOOD PRESSURE: 70 MMHG | SYSTOLIC BLOOD PRESSURE: 132 MMHG

## 2019-03-17 LAB
HCT VFR BLD CALC: 36.3 % (ref 42–54)
HGB BLD-MCNC: 11.5 G/DL (ref 13.5–17.5)

## 2019-03-17 NOTE — NUR
PATIENT IS QUIET AT TIMES, SEEMS TO BE "THINKING" AND TO HIMSELF SOMETIMES
HOWEVER HE PROMPTLY RESPONDS WHEN SPOKEN TO, COMPLIANT WITH MEDS, NO ADVERSE
REACTION NOTED. WILL FOLLOW POC

## 2019-03-17 NOTE — NUR
RECEIVED PATIENT IN DINING ROOM FOR B'FAST, ALERT, CALM, MILDLY CONFUSED. NO
BEHAVIOR ISSUES.
 
MEDS ADMIN PER ORDERS WITH COMPLETE MED COMPLIANCE NOTED.
 
COOPERATIVE WITH GROUP AND STAFF REQUESTS.
 
CONT POC INCLUDING MEDS AND GROUP THERAPY AS DIRECTED.

## 2019-03-18 VITALS — SYSTOLIC BLOOD PRESSURE: 134 MMHG | DIASTOLIC BLOOD PRESSURE: 70 MMHG

## 2019-03-18 VITALS — DIASTOLIC BLOOD PRESSURE: 77 MMHG | SYSTOLIC BLOOD PRESSURE: 129 MMHG

## 2019-03-18 NOTE — NUR
Nutrition follow-up:
Diet: ADA consistent CHO
PO intake % of meals
Labs reviewed
No new wt to assess
+BM
PO intake is good at this time
RDN following.

## 2019-03-19 VITALS — SYSTOLIC BLOOD PRESSURE: 127 MMHG | DIASTOLIC BLOOD PRESSURE: 81 MMHG

## 2019-03-19 VITALS — SYSTOLIC BLOOD PRESSURE: 131 MMHG | DIASTOLIC BLOOD PRESSURE: 73 MMHG

## 2019-03-19 NOTE — NUR
PT IS AWAKE AND ALERT AND ORIENTED X 4. PT BECOMES UPSET EASILY. DOES NOT LIKE
STAFF TO HELP WITH [facility]. REDIRECT AND REORIENT AS NEEDED. MED COMPLIANT.
NO SI NOTED OR VOICED. DENIES ANY THOUGHTS OF SELF HARM. WILL CONTINUE TO
MONITOR Q 15 MINUTES FOR SAFETY. WILL CPOC.

## 2019-03-19 NOTE — PN
PATIENT:SHEREE MOON                      MEDICAL RECORD: [id number]
                                                         LOCATION:[facility]
                                                         ADMISSION DATE: 03/05/19
 
PROGRESS NOTE
 
 
DATE OF SERVICE:  03/16/2019
 
SUBJECTIVE:  Mr. Moon is a 68-year-old male who was at [facility], doing PT and OT, status post CVA.  He reported SI to a physical
therapist there and was brought here.  The patient's interview was seemingly
together but the longer he interviews, his thoughts start derailing.  He has
nonlogical pattern.  When asked about PT, he started talking about being in
[place] and it became increasingly hard to follow his thought process.  He
denies SI now; but according to nurses, requires much care and placement is
being considered.  He is 75%, 100%, and 100%.  Last bowel movement on March 15. 
Last blood sugar at 7:00 a.m. of 89.  He slept 8.25 hours.
 
LATEST VITAL SIGNS:  Temperature 98.1, pulse 86, respirations 20, blood pressure
133/80, and saturation 94%.
 
ASSESSMENT:  Unchanged.
 
PLAN:  Continue to monitor for SI.  Continue current treatment plan including
helping find the most appropriate placement for this gentleman.
 
TRANSINT:[id number] Voice Confirmation ID: [id number] DOCUMENT ID: [id number]
 
 
 
 
                                           
                                           BON CENTENO MD           
 
 
 
Electronically Signed by BON CENTENO on 03/19/19 at 1622
 
 
 
 
 
 
 
 
 
 
 
 
 
CC:                                                             [id number]
DICTATION DATE: 03/16/19 1446     :     03/16/19 2020      ADM IN  
                                                                              
[place] [id number] [place] [phone]

## 2019-03-19 NOTE — PN
PATIENT:SHEREE MOON                      MEDICAL RECORD: [id number]
                                                         LOCATION:[facility]
                                                         ADMISSION DATE: 03/05/19
 
PROGRESS NOTE
 
 
DATE OF SERVICE:  03/18/2019
 
SUBJECTIVE:  Mr. Moon is a 68-year-old male who originally came in for SI.
 He denies it now.  Apparently, there has been confusion on PT and OT.  His
caretaker and his DPOA say that they are trying to set him up with his original
caretaker as he does not seem to be suited for rehab at this point.  He is
eating 75%/100%/100%, slept 7-1/2 hours, and last bowel movement on 17th.
 
LATEST VITAL SIGNS:  Temperature 98.6, pulse 87, respirations 18, blood pressure
129/77, and saturation 98%. 
 
ASSESSMENT:  Unchanged.
 
PLAN:  Anticipate discharge as soon as at home care is lined up and available. 
Case discussed with nursing.  Case discussed with treatment team.  Chart was
reviewed and the patient interviewed.
 
TRANSINT:[id number] Voice Confirmation ID: [id number] DOCUMENT ID: [id number]
 
 
 
 
                                           
                                           BON CENTENO MD           
 
 
 
Electronically Signed by BON CENTENO on 03/19/19 at 1622
 
 
 
 
 
 
 
 
 
 
 
 
 
 
 
 
CC:                                                             [id number]
DICTATION DATE: 03/18/19 1344     :     03/18/19 1423      ADM IN  
                                                                              
[place] [id number] [place] [address]

## 2019-03-19 NOTE — NUR
PATIENT IS AGITATED AT TIMES, DOES NOT LIKE TO DO THINGS FOR HIMSELF. AAOX4.
COMPLIANT WITH MEDS. NO ADVERSE REACTION NOTED. WILL FOLLOW POC

## 2019-03-20 VITALS — DIASTOLIC BLOOD PRESSURE: 77 MMHG | SYSTOLIC BLOOD PRESSURE: 149 MMHG

## 2019-03-20 NOTE — NUR
PT IS AWAKE AND ALERT. CALM AMD COOPERATIVE WITH ASSESSMENT. DENIES ANY
FEELINGS OR THOUGHTS OF SELF HARM AT THIS TIME. REDIRECT AND REORIENT AS
NEEDED. MED COMPLIANT. WILL CPOC.

## 2019-03-20 NOTE — NUR
PT DISCHARGED HOME WITH CAREGIVER. CAREGIVER PRESENT. ALL DISCHARGE PAPERWORK
DISCUSSED AND COPY SENT WITH CAREGIVER. MEDS CALLED INTO [facility]
PER REQUEST. ALL MEDS REVIEWED AND DISCUSSED WITH CAREGIVER. CAREGIVER STATED
" I LEAVE HERE GO GET MEDS, IF LESS THAN $100.00 I WILL , OVER $100.00
WILL DRIVE TO [facility] TO [facility]." THE IMPORTANCE OF PICKING UP MEDS FOR PT.
EXPLAINED. CAREGIVER STATES," I UNDERSTAND." NO S/SX OF DISTRESS NOTED AT
TIME OF DISCHARGE.

## 2019-03-20 NOTE — NUR
B) Patient is alert and oriented to person, place and time, calm and
cooperative this shift, no S.I.
I) Administered scheduled medications as ordered, monitored for safety
R) Medication compliant, grumpy at times, follows instructions,
P) Continue plan of care.

## 2019-03-23 NOTE — DS
PATIENT:SHEREE MOON              :46   MEDICAL RECORD: [id number]
 
                              DISCHARGE SUMMARY
                                                         
ADMISSION DATE:    19                       DISCHARGE DATE:     19
 
 
HOSPITAL COURSE:  Mr. Moon is a 68-year-old male who was admitted
secondary to suicidal ideation and memory deficits and refusing to participate
in PT and OT.  Throughout the course of the admission, the patient has minimized
any suicidal ideation.  He has appeared confused, continues to state that one of
his options is going back to work when he is clearly physically and cognitively
unprepared to do so.  He, on the day of discharge, has not suicidal or homicidal
ideation, auditory or visual hallucinations or delusions.
 
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
CVAs, history of seizure disorder, diabetes, hypertension, hyperlipidemia,
anemia, benign prostatic hypertrophy.
 
DISCHARGE MEDICATIONS:  Include one day of Tamiflu, Namenda 5 mg b.i.d.,
Robitussin-DM 5 mL q. 6 hours p.r.n., Megace 40 mg b.i.d., Proventil inhaler
1.25 mg t.i.d. updraft, Lexapro 20 mg daily, insulin sliding scale, Lipitor 40
mg q.h.s., Glucophage 1000 mg b.i.d., Glucotrol 10 mg b.i.d. a.c., metoprolol 25
mg b.i.d., Keppra 1000 mg b.i.d., Allegra 60 mg daily, Pepcid 20 mg daily,
Norvasc 10 mg daily.
 
DISCHARGE MENTAL STATUS:  This is a 68-year-old  male, dressed and
groomed appropriately.  Cooperative with the interview.  His only complaint was
some diarrhea this morning.  He denies suicidal or homicidal ideation, auditory
or visual hallucinations, or delusions.  Case was discussed with treatment team,
chart reviewed, and the patient interviewed.
 
[facility] to [facility] him up
and he also has home health.
 
TRANSINT:[id number] Voice Confirmation ID: [id number] DOCUMENT ID: [id number]
                                           
                                           BON CENTENO MD           
 
 
 
Electronically Signed by BON CENTENO on 19 at 1252
 
 
 
 
 
 
 
CC:                                                             [id number]
DICTATION DATE: 19 1357     :     19 0200      DIS IN  
                                                                      19
[facility]                                          
[address] [place] [phone]

## 2019-03-23 NOTE — PN
PATIENT:SHEREE MOON                      MEDICAL RECORD: [id number]
                                                         [facility]
                                                         ADMISSION DATE: 03/05/19
 
PROGRESS NOTE
 
 
DATE OF SERVICE:  03/19/2019
 
SUBJECTIVE:  Mr. Moon is a 68-year-old male who was admitted secondary to
suicidal ideation, the patient has denied for some time now, been working out
discharge disposition as he is unable to care for self.  Apparently, the patient
wishes to go back with his 's personal care advisor and with supplemental
services being added by a  here.  The patient seems to be somewhat
unrealistic about what he can do stating he thinks he can go back to work.  He
is anxious to leave and go back home.  He is eating 175 and 100%.  Last bowel
movement was on 19th.  His blood sugar 119 and slept 9.75 hours.
 
OBJECTIVE:
VITAL SIGNS:  98.1, 105, 20, 127/81, and 95%.
 
ASSESSMENT:  Unchanged.
 
PLAN:  Anticipate discharge tomorrow for home with follow up with [facility] and other services.  Case discussed with nursing.  Chart was
reviewed and the patient interviewed.
 
TRANSINT:[id number] Voice Confirmation ID: [id number] DOCUMENT ID: [id number]
 
 
 
 
                                           
                                           BON CENTENO MD           
 
 
 
Electronically Signed by BON CENTENO on 03/23/19 at 1252
 
 
 
 
 
 
 
 
 
 
 
 
 
CC:                                                             [id number]
DICTATION DATE: 03/19/19 1752     :     03/19/19 2333      DIS IN  
                                                                      03/20/19
[id number] [place] [address]

## 2019-03-25 NOTE — PN
PATIENT:SHEREE WHITNEY                      MEDICAL RECORD: [id number]
                                                         LOCATION:[facility]
                                                         ADMISSION DATE: 03/05/19
 
PROGRESS NOTE
 
 
DATE OF SERVICE:  03/15/2019
 
SUBJECTIVE:  The patient's case was discussed with staff.  He has no new
complaint.
 
OBJECTIVE:  The patient is in good behavioral control.  He has poor insight
about his condition.
 
ASSESSMENT:
1.  Vascular dementia.
2.  Major depression.
 
PLAN:  Current medicines will be maintained.  Nursing home placement is being
sought.  His behaviors have been good.  He is eating well.  He is sleeping
reasonably well.
 
TRANSINT:[id number] Voice Confirmation ID: [id number] DOCUMENT ID: [id number]
 
 
 
 
                                           
                                           NAZANIN QUEVEDO MD             
 
 
 
Electronically Signed by NAZANIN QUEVEDO on 03/25/19 at 1316
 
 
 
 
 
 
 
 
 
 
 
 
 
 
 
 
 
CC:                                                             [id number]
DICTATION DATE: 03/15/19 1345     :     03/15/19 1429      DIS IN  
                                                                      03/20/19
[id number] [place] [address]